# Patient Record
Sex: FEMALE | Race: AMERICAN INDIAN OR ALASKA NATIVE
[De-identification: names, ages, dates, MRNs, and addresses within clinical notes are randomized per-mention and may not be internally consistent; named-entity substitution may affect disease eponyms.]

---

## 2019-08-13 ENCOUNTER — HOSPITAL ENCOUNTER (EMERGENCY)
Dept: HOSPITAL 5 - ED | Age: 34
Discharge: HOME | End: 2019-08-13
Payer: SELF-PAY

## 2019-08-13 VITALS — SYSTOLIC BLOOD PRESSURE: 114 MMHG | DIASTOLIC BLOOD PRESSURE: 72 MMHG

## 2019-08-13 DIAGNOSIS — M62.830: ICD-10-CM
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
## 2019-08-13 DIAGNOSIS — S39.012A: ICD-10-CM
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
## 2019-08-13 DIAGNOSIS — S93.401A: Primary | ICD-10-CM

## 2019-08-13 PROCEDURE — 72100 X-RAY EXAM L-S SPINE 2/3 VWS: CPT

## 2019-08-13 NOTE — XRAY REPORT
LUMBAR SPINE 3 VIEWS.



INDICATION / CLINICAL INFORMATION:

slipped, lower back pain



COMPARISON:

None available.

 

FINDINGS:



BONES / JOINT(S): No acute fracture or subluxation. Mild degenerative disc disease L4-L5.

SOFT TISSUES: No significant abnormality.



ADDITIONAL FINDINGS: An IUD overlies the pelvis.







Signer Name: Colten Mccall MD 

Signed: 8/13/2019 8:20 PM

 Workstation Name: [id number]

## 2019-08-13 NOTE — EMERGENCY DEPARTMENT REPORT
ED General Adult HPI





- General


Chief complaint: Fall


Stated complaint: (R)ANKLE/(R)KNEE/BACK PAIN


Time Seen by Provider: 19 18:02


Source: patient


Mode of arrival: Ambulatory


Limitations: No Limitations





- History of Present Illness


Initial comments: 





Patient is a 34-year-old -American female with no past medical history 

presents to the ED with Kyle of acute onset severe right ankle pain and low 

back pain after she slipped on the flow and twisted her lower back and her right

ankle to break a fall about 2 hours ago.  Patient states that the pain in the 

lower back and right ankle of worsened in the last one now and that she is not 

able to bear weight on her right ankle because of pain.  Patient denies fall, 

traumatic injury, numbness and tingling of lower extremities bilaterally, 

nausea, vomiting, hematuria, dizziness, abdominal pain, saddle paresthesia, 

urinary bowel incontinence.


MD Complaint: low back pain; right ankle pain


-: Sudden, hour(s) (2)


Location: back (lower), lower extremity (right ankle)


Radiation: non-radiation


Severity scale (0 -10): 8


Quality: aching, sharp, constant


Consistency: constant


Improves with: none


Worsens with: movement


Associated Symptoms: denies other symptoms.  denies: confusion, chest pain, 

cough, diaphoresis, fever/chills, headaches, loss of appetite, malaise, 

nausea/vomiting, rash, seizure, shortness of breath, syncope, weakness


Treatments Prior to Arrival: none





- Related Data


                                  Previous Rx's











 Medication  Instructions  Recorded  Last Taken  Type


 


Baclofen 20 mg PO Q8H PRN #24 tablet 19 Unknown Rx


 


Ibuprofen [Motrin] 800 mg PO Q8HR PRN #24 tablet 19 Unknown Rx


 


predniSONE [Deltasone] 60 mg PO QDAY #15 tab 19 Unknown Rx


 


traMADol [Ultram] 50 mg PO Q6HR PRN #15 tablet 19 Unknown Rx











                                    Allergies











Allergy/AdvReac Type Severity Reaction Status Date / Time


 


No Known Allergies Allergy   Unverified 19 18:01














ED Review of Systems


ROS: 


Stated complaint: (R)ANKLE/(R)KNEE/BACK PAIN


Other details as noted in HPI





Constitutional: denies: chills, fever


Eyes: denies: eye pain, eye discharge, vision change


ENT: denies: ear pain, throat pain


Respiratory: denies: cough, shortness of breath, wheezing


Cardiovascular: denies: chest pain, palpitations


Endocrine: no symptoms reported


Gastrointestinal: denies: abdominal pain, nausea, diarrhea


Genitourinary: denies: urgency, dysuria, discharge


Musculoskeletal: back pain (lower), arthralgia (right ankle).  denies: joint 

swelling


Skin: denies: rash, lesions


Neurological: denies: headache, weakness, paresthesias


Psychiatric: denies: anxiety, depression


Hematological/Lymphatic: denies: easy bleeding, easy bruising





ED Past Medical Hx





- Past Medical History


Previous Medical History?: No





- Surgical History


Past Surgical History?: No





- Social History


Smoking Status: Never Smoker


Substance Use Type: None





- Medications


Home Medications: 


                                Home Medications











 Medication  Instructions  Recorded  Confirmed  Last Taken  Type


 


Baclofen 20 mg PO Q8H PRN #24 tablet 19  Unknown Rx


 


Ibuprofen [Motrin] 800 mg PO Q8HR PRN #24 tablet 19  Unknown Rx


 


predniSONE [Deltasone] 60 mg PO QDAY #15 tab 19  Unknown Rx


 


traMADol [Ultram] 50 mg PO Q6HR PRN #15 tablet 19  Unknown Rx














ED Physical Exam





- General


Limitations: No Limitations


General appearance: alert, in no apparent distress





- Head


Head exam: Present: atraumatic, normocephalic, normal inspection





- Eye


Eye exam: Present: normal appearance, PERRL, EOMI





- ENT


ENT exam: Present: normal exam, normal orophraynx, mucous membranes moist, TM's 

normal bilaterally, normal external ear exam





- Neck


Neck exam: Present: normal inspection, full ROM.  Absent: tenderness, 

meningismus, lymphadenopathy, thyromegaly





- Respiratory


Respiratory exam: Present: normal lung sounds bilaterally.  Absent: respiratory 

distress, wheezes, rales, stridor, chest wall tenderness





- Cardiovascular


Cardiovascular Exam: Present: regular rate, normal rhythm, normal heart sounds. 

 Absent: systolic murmur, diastolic murmur, rubs, gallop





- GI/Abdominal


GI/Abdominal exam: Present: soft, normal bowel sounds.  Absent: distended, 

tenderness, guarding, rebound, hyperactive bowel sounds, organomegaly, mass





- Rectal


Rectal exam: Present: deferred





- Extremities Exam


Extremities exam: Present: normal inspection, tenderness (right ankle 

tenderness), normal capillary refill.  Absent: joint swelling





- Back Exam


Back exam: Present: normal inspection, tenderness (palpable lumbosacral 

paraspinal musculoskeletal tenderness), muscle spasm, paraspinal tenderness.  

Absent: full ROM (limited ROM due to pain), CVA tenderness (R), CVA tenderness 

(L), vertebral tenderness





- Neurological Exam


Neurological exam: Present: alert, oriented X3, CN II-XII intact, normal gait, 

reflexes normal





- Psychiatric


Psychiatric exam: Present: normal affect, normal mood





- Skin


Skin exam: Present: warm, dry, intact, normal color.  Absent: rash





ED Course


                                   Vital Signs











  19





  18:02


 


Temperature 98.2 F


 


Pulse Rate 78


 


Respiratory 19





Rate 


 


Blood Pressure 114/75





[Left] 


 


O2 Sat by Pulse 98





Oximetry 














- Reevaluation(s)


Reevaluation #1: 





19 21:40


This is a 34-year-old American female who presented to the ED with acute onset 

of low back pain and right ankle pain after she twisted to prevent herself from 

falling at home about 2 hours ago.  Patient is alert and oriented 3 and is not 

in distress with normal vital signs but appears to be in pain in the ED.  L-

spine x-ray shows no acute fractures or subluxations.  Right ankle x-ray shows 

no fractures or subluxations.  Patient's symptoms appear to be musculoskeletal 

possibly muscle strain or muscle spasms.  Patient was treated in the ED with 

pain medications and discharged home on pain medications and muscle relaxants, 

and advised to follow-up with her primary care physician in 5-7 days for 

reevaluation or return to the ED immediately if symptoms get worse.





ED Medical Decision Making





- Radiology Data


Radiology results: report reviewed, image reviewed





Findings


[facility] 


[address] [place] [address] 





XRay Report 


Signed 





Patient: EVER VELIZ 


R#: [id number] 


: 1985 Acct:[id number] 





Age/Sex: 34 / F ADM Date: 19 





Loc: ED 


Attending Dr: 








Ordering Physician: YOBANI SOUSA 


Date of Service: 19 


Procedure(s): XR ankle 3+V RT 


Accession Number(s): [id number] 





cc: YOBANI SOUSA 





Fluoro Time In Minutes: 





RIGHT ANKLE 3 VIEWS. 





INDICATION / CLINICAL INFORMATION: 


slipped, right ankle pain 





COMPARISON: 


None available. 





FINDINGS: 





BONES / JOINT(S): No acute fracture or subluxation. No significant arthritis. 


SOFT TISSUES: Mild soft tissue swelling laterally. 





ADDITIONAL FINDINGS: None. 











Signer Name: Colten Mccall MD 


Signed: 2019 8:19 PM 


Workstation Name: [id number] 








Transcribed By: ES 


Dictated By: Colten Mccall MD 


Electronically Authenticated By: Colten Mccall MD 


Signed Date/Time: 19 2019 











 

--------------------------------------------------------------------------------


-------------------------------------------------








Findings


[facility] 


[address] [place] [address] 





XRay Report 


Signed 





Patient: EVER VELIZ 


R#: [id number] 


: 1985 Acct:[id number] 





Age/Sex: 34 / F ADM Date: 19 





Loc: ED 


Attending Dr: 








Ordering Physician: YOBANI SOUSA 


Date of Service: 19 


Procedure(s): XR spine lumbosacral 2-3V 


Accession Number(s): [id number] 





cc: YOABNI SOUSA 





Fluoro Time In Minutes: 





LUMBAR SPINE 3 VIEWS. 





INDICATION / CLINICAL INFORMATION: 


slipped, lower back pain 





COMPARISON: 


None available. 





FINDINGS: 





BONES / JOINT(S): No acute fracture or subluxation. Mild degenerative disc 

disease L4-L5. 


SOFT TISSUES: No significant abnormality. 





ADDITIONAL FINDINGS: An IUD overlies the pelvis. 











Signer Name: Colten Mccall MD 


Signed: 2019 8:20 PM 


Workstation Name: [id number] 








Transcribed By: ES 


Dictated By: Colten Mccall MD 


Electronically Authenticated By: Colten Mccall MD 


Signed Date/Time: 19 











- Medical Decision Making





This is a 34-year-old American female who presented to the ED with acute onset 

of low back pain and right ankle pain after she twisted to prevent herself from 

falling at home about 2 hours ago.  Patient is alert and oriented 3 and is not 

in distress with normal vital signs but appears to be in pain in the ED.  L-

spine x-ray shows no acute fractures or subluxations.  Right ankle x-ray shows 

no fractures or subluxations.  Patient's symptoms appear to be musculoskeletal 

possibly muscle strain or muscle spasms.  Patient was treated in the ED with 

pain medications and discharged home on pain medications and muscle relaxants, 

and advised to follow-up with her primary care physician in 5-7 days for reeva

luation or return to the ED immediately if symptoms get worse.





- Differential Diagnosis


muscle spasm; right ankle sprain; muscle strain


Critical care attestation.: 


If time is entered above; I have spent that time in minutes in the direct care 

of this critically ill patient, excluding procedure time.








ED Disposition


Clinical Impression: 


 Spasm of muscle of lower back, Muscle strain





Acute low back pain


Qualifiers:


 Back pain laterality: unspecified Sciatica presence: without sciatica Qualified

 Code(s): M54.5 - Low back pain





Severe sprain of right ankle


Qualifiers:


 Encounter type: initial encounter Qualified Code(s): S93.401A - Sprain of 

unspecified ligament of right ankle, initial encounter





Disposition:  TO HOME OR SELFCARE


Is pt being admited?: No


Does the pt Need Aspirin: No


Condition: Stable


Instructions:  Muscle Strain (ED), Ankle Sprain (ED), Muscle Spasm (ED), Acute 

Low Back Pain (ED)


Additional Instructions: 


Take medications with food, drink plenty of fluids and follow-up with your 

primary care physician in 5-7 days for reevaluation.  Return to the ED 

immediately if symptoms get worse.


Prescriptions: 


Baclofen 20 mg PO Q8H PRN #24 tablet


 PRN Reason: Spasms


predniSONE [Deltasone] 60 mg PO QDAY #15 tab


Ibuprofen [Motrin] 800 mg PO Q8HR PRN #24 tablet


 PRN Reason: Pain , Severe (7-10)


traMADol [Ultram] 50 mg PO Q6HR PRN #15 tablet


 PRN Reason: Pain


Referrals: 


[facility] [Outside] - 3-5 Days


Time of Disposition: 21:43


Print Language: ENGLISH

## 2019-08-13 NOTE — EVENT NOTE
ED Screening Note


ED Screening Note: 








pt states she slipped on a cookie and almost fell 


states she has lower back pain and right ankle pain 


did not actually fall 


pt is ambulatory 


LNMP: July 24, 2019








This initial assessment/diagnostic orders/clinical plan/treatment(s) is/are 

subject to change based on patients health status, clinical progression and re-

assessment by fellow clinical providers in the ED. Further treatment and workup 

at subsequent clinical providers discretion. Patient/guardian urged not to elope

from the ED as their condition may be serious if not clinically assessed and 

managed. 





Initial orders include: XR of the L-spine, XR of the right ankle

## 2019-08-13 NOTE — XRAY REPORT
RIGHT ANKLE 3 VIEWS.



INDICATION / CLINICAL INFORMATION:

slipped, right ankle pain



COMPARISON:

None available.

 

FINDINGS:



BONES / JOINT(S): No acute fracture or subluxation. No significant arthritis.

SOFT TISSUES: Mild soft tissue swelling laterally.



ADDITIONAL FINDINGS: None.







Signer Name: Colten Mccall MD 

Signed: 8/13/2019 8:19 PM

 Workstation Name: [id number]